# Patient Record
Sex: FEMALE | Race: WHITE | HISPANIC OR LATINO | Employment: FULL TIME | URBAN - METROPOLITAN AREA
[De-identification: names, ages, dates, MRNs, and addresses within clinical notes are randomized per-mention and may not be internally consistent; named-entity substitution may affect disease eponyms.]

---

## 2018-12-12 ENCOUNTER — NON-PROVIDER VISIT (OUTPATIENT)
Dept: OCCUPATIONAL MEDICINE | Facility: CLINIC | Age: 43
End: 2018-12-12

## 2018-12-12 DIAGNOSIS — Z02.1 PRE-EMPLOYMENT DRUG SCREENING: ICD-10-CM

## 2018-12-12 LAB
AMP AMPHETAMINE: NORMAL
COC COCAINE: NORMAL
INT CON NEG: NORMAL
INT CON POS: NORMAL
MET METHAMPHETAMINES: NORMAL
OPI OPIATES: NORMAL
PCP PHENCYCLIDINE: NORMAL
POC DRUG COMMENT 753798-OCCUPATIONAL HEALTH: NORMAL
THC: NORMAL

## 2018-12-12 PROCEDURE — 80305 DRUG TEST PRSMV DIR OPT OBS: CPT | Performed by: PREVENTIVE MEDICINE

## 2019-01-06 ENCOUNTER — HOSPITAL ENCOUNTER (EMERGENCY)
Dept: HOSPITAL 8 - ED | Age: 44
Discharge: HOME | End: 2019-01-06
Payer: SELF-PAY

## 2019-01-06 VITALS — DIASTOLIC BLOOD PRESSURE: 81 MMHG | SYSTOLIC BLOOD PRESSURE: 141 MMHG

## 2019-01-06 VITALS — HEIGHT: 62 IN | WEIGHT: 156.53 LBS | BODY MASS INDEX: 28.8 KG/M2

## 2019-01-06 DIAGNOSIS — J01.00: ICD-10-CM

## 2019-01-06 DIAGNOSIS — J01.10: ICD-10-CM

## 2019-01-06 DIAGNOSIS — H92.01: Primary | ICD-10-CM

## 2019-01-06 PROCEDURE — 99283 EMERGENCY DEPT VISIT LOW MDM: CPT

## 2022-06-29 ENCOUNTER — HOSPITAL ENCOUNTER (EMERGENCY)
Facility: MEDICAL CENTER | Age: 47
End: 2022-06-29
Attending: EMERGENCY MEDICINE
Payer: COMMERCIAL

## 2022-06-29 VITALS
HEART RATE: 79 BPM | WEIGHT: 151.01 LBS | OXYGEN SATURATION: 94 % | SYSTOLIC BLOOD PRESSURE: 152 MMHG | BODY MASS INDEX: 27.79 KG/M2 | RESPIRATION RATE: 16 BRPM | TEMPERATURE: 98.1 F | DIASTOLIC BLOOD PRESSURE: 89 MMHG | HEIGHT: 62 IN

## 2022-06-29 DIAGNOSIS — L02.91 ABSCESS: ICD-10-CM

## 2022-06-29 PROCEDURE — 700102 HCHG RX REV CODE 250 W/ 637 OVERRIDE(OP): Performed by: EMERGENCY MEDICINE

## 2022-06-29 PROCEDURE — 303977 HCHG I & D

## 2022-06-29 PROCEDURE — 99283 EMERGENCY DEPT VISIT LOW MDM: CPT

## 2022-06-29 PROCEDURE — A9270 NON-COVERED ITEM OR SERVICE: HCPCS | Performed by: EMERGENCY MEDICINE

## 2022-06-29 PROCEDURE — 700111 HCHG RX REV CODE 636 W/ 250 OVERRIDE (IP): Performed by: EMERGENCY MEDICINE

## 2022-06-29 RX ORDER — AMOXICILLIN 500 MG/1
500 CAPSULE ORAL ONCE
Status: COMPLETED | OUTPATIENT
Start: 2022-06-29 | End: 2022-06-29

## 2022-06-29 RX ORDER — SULFAMETHOXAZOLE AND TRIMETHOPRIM 800; 160 MG/1; MG/1
1 TABLET ORAL ONCE
Status: COMPLETED | OUTPATIENT
Start: 2022-06-29 | End: 2022-06-29

## 2022-06-29 RX ORDER — AMOXICILLIN 500 MG/1
500 CAPSULE ORAL 3 TIMES DAILY
Qty: 15 CAPSULE | Refills: 0 | Status: SHIPPED | OUTPATIENT
Start: 2022-06-29 | End: 2022-07-04

## 2022-06-29 RX ORDER — HYDROCODONE BITARTRATE AND ACETAMINOPHEN 5; 325 MG/1; MG/1
1 TABLET ORAL ONCE
Status: COMPLETED | OUTPATIENT
Start: 2022-06-29 | End: 2022-06-29

## 2022-06-29 RX ORDER — SULFAMETHOXAZOLE AND TRIMETHOPRIM 800; 160 MG/1; MG/1
1 TABLET ORAL 2 TIMES DAILY
Qty: 10 TABLET | Refills: 0 | Status: SHIPPED | OUTPATIENT
Start: 2022-06-29 | End: 2022-07-04

## 2022-06-29 RX ORDER — HYDROCODONE BITARTRATE AND ACETAMINOPHEN 5; 325 MG/1; MG/1
1 TABLET ORAL EVERY 4 HOURS PRN
Qty: 5 TABLET | Refills: 0 | Status: SHIPPED | OUTPATIENT
Start: 2022-06-29 | End: 2022-07-02

## 2022-06-29 RX ADMIN — SULFAMETHOXAZOLE AND TRIMETHOPRIM 1 TABLET: 800; 160 TABLET ORAL at 14:00

## 2022-06-29 RX ADMIN — HYDROCODONE BITARTRATE AND ACETAMINOPHEN 1 TABLET: 5; 325 TABLET ORAL at 13:18

## 2022-06-29 RX ADMIN — LIDOCAINE HYDROCHLORIDE 20 ML: 10 INJECTION, SOLUTION EPIDURAL; INFILTRATION; INTRACAUDAL; PERINEURAL at 13:30

## 2022-06-29 RX ADMIN — AMOXICILLIN 500 MG: 500 CAPSULE ORAL at 14:00

## 2022-06-29 NOTE — DISCHARGE INSTRUCTIONS
Please take the antibiotics as prescribed, your first dose was given in the emergency department see you may begin your prescriptions tonight.  You may take the pain medication as needed.  Return to the emergency department if you develop any new or worsening symptoms including worsening pain, swelling, redness, worsening rash, fevers, or if you have any further concerns.  The wound may continue to drain, please apply warm compresses or take warm showers at least twice daily to encourage it to do so.

## 2022-06-29 NOTE — ED NOTES
Pt ambulatory to room 80. Reports pain to R armpit x3 days.   3 areas of swelling noted to R armpit, no excessive redness or warmth to touch. Pt states used a metal razor that she hasn't used in a while while shaving.   Denies fevers/chills.   Pt resting, in NAD. Chart up for ERP.

## 2022-06-29 NOTE — ED PROVIDER NOTES
"ED Physician Note    Chief Concern:   Right axillary abscess    HPI:  Marilu Panchal is a very pleasant 46-year-old woman who presents to the emergency department for evaluation of pain and swelling localized to the right axilla.  Symptoms began about 3 days ago, initially described as a small pimple-like area, she has had progressively worsening pain and swelling since that time, and now has a second area of pain and swelling in proximity of the first, and the right axilla as well.  She reports no history of impaired immunity, she reports no history of diabetes, she does smoke cigarettes regularly.  She reports no other rashes or lesions, she has no associated fevers, no nausea, no vomiting.  She has no other concerns at this time.    Review of Systems:  See HPI for pertinent positives and negatives.  Past Medical History:       Social History:  Social History     Tobacco Use   • Smoking status: Current Every Day Smoker     Packs/day: 0.50     Types: Cigarettes   • Smokeless tobacco: Never Used   Vaping Use   • Vaping Use: Never used   Substance and Sexual Activity   • Alcohol use: No   • Drug use: No   • Sexual activity: Not on file       Surgical History:  patient denies any surgical history    Current Medications:  Home Medications    **Home medications have not yet been reviewed for this encounter**         Allergies:  No Known Allergies    Physical Exam:  Vital Signs: BP (!) 163/101   Pulse 87   Temp 36.7 °C (98 °F) (Temporal)   Resp 16   Ht 1.575 m (5' 2\")   Wt 68.5 kg (151 lb 0.2 oz)   SpO2 92%   BMI 27.62 kg/m²   Constitutional: Alert, no acute distress  HENT: Atraumatic  Neck: Normal range of motion  Cardiovascular: Right upper extremity warm, well perfused  Pulmonary: Normal work of breathing  Skin: Right axilla with a central area of fluctuance with some overlying redness and surrounding erythema.  There is a small area of induration just distal to the abscess, though no palpable fluctuance.  " Area is appropriately tender to palpation.  Fluctuance is very superficial on palpation.  Musculoskeletal: Normal range of motion of the right shoulder, no evidence of septic joint, abnormalities as documented in skin exam  Neurologic: Right upper extremity with normal sensory and motor function    Medical records reviewed for continuity of care.  No recent visits for similar symptoms.    ED Medications Administered:  Medications   lidocaine (XYLOCAINE) 1 % injection 20 mL (has no administration in time range)   sulfamethoxazole-trimethoprim (BACTRIM DS) 800-160 MG tablet 1 Tablet (has no administration in time range)   amoxicillin (AMOXIL) capsule 500 mg (has no administration in time range)   HYDROcodone-acetaminophen (NORCO) 5-325 MG per tablet 1 Tablet (1 Tablet Oral Given 6/29/22 1318)     MDM:  Ms. Panchal presents to the emergency department for evaluation of an abscess and cellulitis localized to the right axilla.  Symptoms began about 3 days ago, however very mild initially and has progressively worsened.  Area is very superficial, there is palpable fluctuance, no evidence of lymph node involvement.  She is afebrile with no systemic symptoms.  Abscess was incised and drained according to the below procedure note, she tolerated the procedure well without complications.  She was given a dose of amoxicillin as well as Bactrim in the emergency department and discharged on prescriptions for the same.  She was also given a dose of hydrocodone in the emergency department and discharged with a small amount of hydrocodone tablets for ongoing pain.  She is counseled to follow-up with her primary care physician for complete recheck within 2 to 3 days. Return precautions were discussed with the patient, and provided in written form with the patient's discharge instructions.       Procedure Note: Complex Incision and Drainage  The procedure was performed by Dr. Osman. Risks, benefits and alternatives were discussed  and consent was obtained from the patient. A time out was performed and appropriate patient, procedure, site, and equipment were verified.  Procedure: Abscess drainage  Location: Right axilla  Anesthesia: Local infiltration with 1 mL of 1% lidocaine without epinephrine  Skin overlying the wound was cleansed with betadine prior to the procedure. After verification of adequate anesthesia, #11 blade was used to make a single incision over the area of greatest fluctuance. The wound was probed using sterile forceps to break up multiple loculations. A moderate amount of purulent and sanguinous fluid was drained. Wound was dressed on completion of the procedure. The procedure was well tolerated with minimal blood loss and no complications.       Personal protective equipment including N95 surgical respirator, goggles, and gloves were used during this encounter.       Disposition:  Discharge home in stable condition    Final Impression:  1. Abscess        Electronically signed by: Tosin Osman MD, 6/29/2022 1:58 PM

## 2022-06-29 NOTE — ED NOTES
Pt medicated per MAR. Tolerated well. Pt resting, no distress,  at bedside.  Lidocaine provided to ERP.

## 2022-06-29 NOTE — ED TRIAGE NOTES
Marilu Panchal  46 y.o. female  Chief Complaint   Patient presents with   • Arm Pain     Under right armpit pt has pain, redness and swelling. Worsening for the last 3x days.        Vitals:    06/29/22 1127   BP: (!) 163/101   Pulse: 87   Resp: 16   Temp: 36.7 °C (98 °F)   SpO2: 92%       Patient educated on triage process and encouraged to alert staff of any changes in condition.    Pt ambulatory to triage for the above complaint. Denies any recent fevers.

## 2022-07-07 ENCOUNTER — HOSPITAL ENCOUNTER (EMERGENCY)
Facility: MEDICAL CENTER | Age: 47
End: 2022-07-07
Attending: EMERGENCY MEDICINE
Payer: COMMERCIAL

## 2022-07-07 VITALS
RESPIRATION RATE: 18 BRPM | OXYGEN SATURATION: 96 % | SYSTOLIC BLOOD PRESSURE: 135 MMHG | HEIGHT: 62 IN | DIASTOLIC BLOOD PRESSURE: 88 MMHG | WEIGHT: 151.68 LBS | TEMPERATURE: 97.6 F | BODY MASS INDEX: 27.91 KG/M2 | HEART RATE: 77 BPM

## 2022-07-07 DIAGNOSIS — L02.411 ABSCESS OF RIGHT AXILLA: ICD-10-CM

## 2022-07-07 LAB
GRAM STN SPEC: NORMAL
SIGNIFICANT IND 70042: NORMAL
SITE SITE: NORMAL
SOURCE SOURCE: NORMAL

## 2022-07-07 PROCEDURE — 96372 THER/PROPH/DIAG INJ SC/IM: CPT

## 2022-07-07 PROCEDURE — 303977 HCHG I & D

## 2022-07-07 PROCEDURE — 87205 SMEAR GRAM STAIN: CPT

## 2022-07-07 PROCEDURE — 99283 EMERGENCY DEPT VISIT LOW MDM: CPT

## 2022-07-07 PROCEDURE — 700111 HCHG RX REV CODE 636 W/ 250 OVERRIDE (IP): Performed by: EMERGENCY MEDICINE

## 2022-07-07 PROCEDURE — 87070 CULTURE OTHR SPECIMN AEROBIC: CPT

## 2022-07-07 PROCEDURE — 87077 CULTURE AEROBIC IDENTIFY: CPT

## 2022-07-07 PROCEDURE — 87186 SC STD MICRODIL/AGAR DIL: CPT

## 2022-07-07 RX ADMIN — LIDOCAINE HYDROCHLORIDE 20 ML: 10 INJECTION, SOLUTION EPIDURAL; INFILTRATION; INTRACAUDAL; PERINEURAL at 16:15

## 2022-07-07 RX ADMIN — MORPHINE SULFATE 10 MG: 10 INJECTION INTRAVENOUS at 16:13

## 2022-07-07 ASSESSMENT — FIBROSIS 4 INDEX: FIB4 SCORE: 0.48

## 2022-07-07 NOTE — ED PROVIDER NOTES
"ED Provider Note    CHIEF COMPLAINT  Chief Complaint   Patient presents with   • Abscess       HPI  Marilu Panchal is a 46 y.o. female who presents complaining of recurrent right axillary abscesses.    Patient states she was here last week for incision and drainage of a right axillary abscess.  She has no history of this.  She has developed several more, smaller, less painful ones in the same region and one larger one.  She does shave her armpits.  She has none in the groin area.  She denies a history of MRSA.  Patient states she took the antibiotics she was prescribed following the last incision and drainage.  The area that was incised last time has not recurred.    Patient states her pain is mainly with lowering her arm.  It is moderate.  She did not like the Norco she was prescribed as they made her feel nauseous/sick.    Patient denies fever, chills, history of diabetes.      ALLERGIES  No Known Allergies    CURRENT MEDICATIONS  Home Medications     Reviewed by Luz Ahumada R.N. (Registered Nurse) on 07/07/22 at 1433  Med List Status: Not Addressed   Medication Last Dose Status   methylPREDNISolone (MEDROL, CAMPBELL,) 4 MG tablet  Active                PAST MEDICAL HISTORY     Back pain    SURGICAL HISTORY  patient denies any surgical history    SOCIAL HISTORY  Social History     Tobacco Use   • Smoking status: Current Every Day Smoker     Packs/day: 0.25     Types: Cigarettes   • Smokeless tobacco: Never Used   Vaping Use   • Vaping Use: Never used   Substance and Sexual Activity   • Alcohol use: No   • Drug use: No   • Sexual activity: Not on file         REVIEW OF SYSTEMS  See HPI for further details.  All other systems are negative except as above in HPI.      PHYSICAL EXAM  VITAL SIGNS: BP (!) 146/93   Pulse 79   Temp 36.2 °C (97.1 °F) (Temporal)   Resp 16   Ht 1.575 m (5' 2\")   Wt 68.8 kg (151 lb 10.8 oz)   LMP 06/30/2022   SpO2 95%   BMI 27.74 kg/m²     General:  WDWN female, nontoxic appearing in " NAD; A+Ox3; V/S as above, afebrile  Skin: warm and dry; good color; no rash  HEENT: NCAT; EOMs intact; PERRL; no scleral icterus   Neck: FROM; soft  Extremities: OROURKE x 4; tender, fluctuant boil with a central whitish head; 3 other areas of erythema that are less tender and not fluctuant; no obvious ingrown hairs; no streaking, no crepitus; axillary nodes that are soft and mobile noted  Neurologic: CNs III-XII grossly intact; speech clear; distal sensation intact; strength 5/5 UE/LEs  Psychiatric: Appropriate affect, normal mood    LABS  Wound culture    PROCEDURES  Incision and Drainage Procedure Note    Indication: axillary abscess    Procedure: The patient was positioned appropriately and the skin over the incision site was draped in a sterile fashion. Local anesthesia was obtained by infiltration using 1.0 cc of 1% Lidocaine without epinephrine.  An incision was then made over the center of the lesion and approximately 3 cc of purulent and yellow material was expressed.    The patient tolerated the procedure well.    Complications: None      MEDICAL RECORD  I have reviewed patient's medical record and pertinent results are listed below.      COURSE & MEDICAL DECISION MAKING  I have reviewed any medical record information, laboratory studies and radiographic results as noted.    Marilu Panchal is a 46 y.o. female who presents complaining of right axillary abscesses.  Patient was seen here last week for an incision and drainage of an abscess that has resolved in the right axilla.  She has 4 new boils, one of which is amenable to being I&D'd today.  The others are early in their process.  I have encouraged her to apply warm compresses to the area rather than taking warm showers which I think are drying out her skin as she lets the water run for 20 minutes..  Wound culture was obtained.  I have contacted the ER  and requested that the patient be assigned to a PCP.    Appropriate PPE was worn at all times  while interacting with the patient.      IMPRESSION  1. Abscess of right axilla         Electronically signed by: Olya John M.D., 7/7/2022 3:48 PM

## 2022-07-07 NOTE — ED TRIAGE NOTES
"Chief Complaint   Patient presents with   • Abscess     Pt to ED from home states she was seen here for R armpit abscess L&D 1 week prior pt states there are now more. Pt states she finished ordeded Abx.  Pt educated on the triage process. Instructed to notify staff of any change or worsening of condition; sent to lobby to await room assignment.    BP (!) 146/93   Pulse 79   Temp 36.2 °C (97.1 °F) (Temporal)   Resp 16   Ht 1.575 m (5' 2\")   Wt 68.8 kg (151 lb 10.8 oz)   LMP 06/30/2022   SpO2 95%   BMI 27.74 kg/m²     "

## 2022-07-07 NOTE — DISCHARGE INSTRUCTIONS
Use warm compresses several times a day for 15 to 20 minutes.    Return to the ER for fever, increased pain, increased swelling, or other concerns    Follow-up with a primary care provider.  We have left a message for the ER  who will call you and assign you to someone.  You may also call the [facility] or [facility] clinics.    A wound culture from your abscess is pending and will let us know if there is a particular bacteria that is causing your abscesses.

## 2022-07-09 LAB
BACTERIA WND AEROBE CULT: ABNORMAL
BACTERIA WND AEROBE CULT: ABNORMAL
GRAM STN SPEC: ABNORMAL
SIGNIFICANT IND 70042: ABNORMAL
SITE SITE: ABNORMAL
SOURCE SOURCE: ABNORMAL

## 2022-07-12 ENCOUNTER — TELEPHONE (OUTPATIENT)
Dept: PHARMACY | Facility: MEDICAL CENTER | Age: 47
End: 2022-07-12

## 2022-07-12 RX ORDER — SULFAMETHOXAZOLE AND TRIMETHOPRIM 800; 160 MG/1; MG/1
1 TABLET ORAL 2 TIMES DAILY
Qty: 10 TABLET | Refills: 0 | Status: SHIPPED | OUTPATIENT
Start: 2022-07-12 | End: 2022-07-17

## 2022-07-13 NOTE — ED NOTES
"ED Positive Culture Follow-up/Notification Note:    Date: 7/12/2022     Patient seen in the ED on 7/7/2022 for recurrent axillary abscesses with I&D in the ED.   1. Abscess of right axilla       Discharge Medication List as of 7/7/2022  4:45 PM          Allergies: Patient has no known allergies.     Vitals:    07/07/22 1406 07/07/22 1431 07/07/22 1644   BP: (!) 146/93  135/88   Pulse: 79  77   Resp: 16  18   Temp: 36.2 °C (97.1 °F)  36.4 °C (97.6 °F)   TempSrc: Temporal  Temporal   SpO2: 95%  96%   Weight:  68.8 kg (151 lb 10.8 oz)    Height: 1.575 m (5' 2\") 1.575 m (5' 2\")        Final cultures:   Results     Procedure Component Value Units Date/Time    CULTURE WOUND W/ GRAM STAIN [[id number]]  (Abnormal)  (Susceptibility) Collected: 07/07/22 1607    Order Status: Completed Specimen: Wound from Abscess Updated: 07/09/22 1050     Significant Indicator POS     Source WND     Site right arpmit abscess     Culture Result -     Gram Stain Result Moderate WBCs.  Few Gram positive cocci.       Culture Result Methicillin Resistant Staphylococcus aureus  Moderate growth      Susceptibility     Methicillin resistant staphylococcus aureus (1)     Antibiotic Interpretation Microscan   Method Status    Ampicillin  [*]  Resistant >8 mcg/mL TERESITA Final    Amoxicillin/CA  [*]  Resistant <=4/2 mcg/mL TERESITA Final    Azithromycin Resistant >4 mcg/mL TERESITA Final    Ceftriaxone  [*]  Resistant <=4 mcg/mL TERESITA Final    Clindamycin Sensitive <=0.25 mcg/mL TERESITA Final    Cephalothin  [*]  Resistant <=8 mcg/mL TERESITA Final    Cefoxitin Screen  [*]  Positive >4 mcg/mL TERESITA Final    Cefazolin Resistant <=8 mcg/mL TERESITA Final    Ciprofloxacin  [*]  Sensitive <=1 mcg/mL TERESITA Final    Cefepime Resistant <=4 mcg/mL TERESITA Final    Ceftaroline Sensitive <=0.5 mcg/mL TERESITA Final    Daptomycin Sensitive <=0.5 mcg/mL TERESITA Final    Ampicillin/sulbactam Resistant <=8/4 mcg/mL TERESITA Final    Erythromycin Intermediate 2 mcg/mL TERESITA Final    Vancomycin Sensitive 1 mcg/mL TERESITA " Final    Gentamicin  [*]  Sensitive <=4 mcg/mL TERESITA Final    Inducible Clindamycin Test  [*]  Negative <=4/0.5 mcg/mL TERESITA Final    Imipenem  [*]  Resistant <=4 mcg/mL TERESITA Final    Levofloxacin  [*]  Sensitive <=1 mcg/mL TERESITA Final    Linezolid  [*]  Sensitive 2 mcg/mL TERESITA Final    Meropenem  [*]  Resistant <=2 mcg/mL TERESITA Final    Oxacillin Resistant >2 mcg/mL TERESITA Final    Rifampin  [*]  Sensitive <=1 mcg/mL TERESITA Final    Synercid  [*]  Sensitive <=0.5 mcg/mL TERESITA Final    Trimeth/Sulfa Sensitive <=0.5/9.5 mcg/mL TERESITA Final    Tetracycline Sensitive <=4 mcg/mL TERESITA Final    Tigecycline  [*]  Sensitive <=0.25 mcg/mL TERESITA Final           [*]  Suppressed Antibiotic                 GRAM STAIN [[id number]] Collected: 07/07/22 1607    Order Status: Completed Specimen: Wound Updated: 07/07/22 2037     Significant Indicator .     Source WND     Site right arpmit abscess     Gram Stain Result Moderate WBCs.  Few Gram positive cocci.            Plan:   Patient not currently on antibiotic therapy. I discussed the result with the patient and she stated that the abscesses were improving and drying out. She did the compresses as instructed. They do continue to itch. I have prescribed her Bactrim DS I po BID x 5 days to the [facility] on [facility].    Ruth Marroquin, PharmD

## 2022-08-03 ENCOUNTER — HOSPITAL ENCOUNTER (EMERGENCY)
Facility: MEDICAL CENTER | Age: 47
End: 2022-08-03
Attending: EMERGENCY MEDICINE
Payer: COMMERCIAL

## 2022-08-03 VITALS
DIASTOLIC BLOOD PRESSURE: 110 MMHG | WEIGHT: 152.56 LBS | HEIGHT: 62 IN | SYSTOLIC BLOOD PRESSURE: 149 MMHG | HEART RATE: 89 BPM | TEMPERATURE: 98.1 F | OXYGEN SATURATION: 95 % | RESPIRATION RATE: 18 BRPM | BODY MASS INDEX: 28.07 KG/M2

## 2022-08-03 DIAGNOSIS — H60.501 ACUTE OTITIS EXTERNA OF RIGHT EAR, UNSPECIFIED TYPE: ICD-10-CM

## 2022-08-03 PROCEDURE — 700102 HCHG RX REV CODE 250 W/ 637 OVERRIDE(OP): Performed by: EMERGENCY MEDICINE

## 2022-08-03 PROCEDURE — A9270 NON-COVERED ITEM OR SERVICE: HCPCS | Performed by: EMERGENCY MEDICINE

## 2022-08-03 PROCEDURE — 99282 EMERGENCY DEPT VISIT SF MDM: CPT

## 2022-08-03 RX ORDER — OFLOXACIN 3 MG/ML
5 SOLUTION AURICULAR (OTIC) DAILY
Qty: 10 ML | Refills: 0 | Status: SHIPPED | OUTPATIENT
Start: 2022-08-03 | End: 2022-08-03

## 2022-08-03 RX ORDER — ACETAMINOPHEN 325 MG/1
650 TABLET ORAL ONCE
Status: COMPLETED | OUTPATIENT
Start: 2022-08-03 | End: 2022-08-03

## 2022-08-03 RX ORDER — CIPROFLOXACIN AND DEXAMETHASONE 3; 1 MG/ML; MG/ML
4 SUSPENSION/ DROPS AURICULAR (OTIC) 2 TIMES DAILY
Qty: 7.5 ML | Refills: 0 | Status: SHIPPED | OUTPATIENT
Start: 2022-08-03 | End: 2022-08-10

## 2022-08-03 RX ADMIN — ACETAMINOPHEN 650 MG: 325 TABLET, FILM COATED ORAL at 10:56

## 2022-08-03 ASSESSMENT — FIBROSIS 4 INDEX: FIB4 SCORE: 0.49

## 2022-08-03 ASSESSMENT — PAIN DESCRIPTION - PAIN TYPE: TYPE: ACUTE PAIN

## 2022-08-03 NOTE — ED NOTES
DC orders received. Patient provided AVS and explained further instructions . Pt educated to come back to ER if symptoms to become worse.   Pt understood. All questions answered. Work noted provided.No line in place. Patient AAOx4 and ambulatory.  Pt discharged to self w/o incident.

## 2022-08-03 NOTE — ED TRIAGE NOTES
"Chief Complaint   Patient presents with   • Ear Pain     R ear pain that started this morning. Pt reports her ear had been \"leaking\" for about a week and she had a fever a couple days ago.        Pt ambulated to triage for above complaint.  Pt is AO x 4, follows commands, and responds appropriately to questions. Patient's breathing is unlabored and pain is currently 10/10 on the 0-10 pain scale.  Pt placed in lobby. Patient educated on triage process and encouraged to alert staff for any changes.     BP (!) 143/95   Pulse 74   Temp 36.6 °C (97.8 °F) (Temporal)   Resp 18   Ht 1.575 m (5' 2\")   Wt 69.2 kg (152 lb 8.9 oz)   SpO2 97%     "

## 2022-08-03 NOTE — ED NOTES
Pt called from [facility] and ambulated to [facility] 02. Pt c/o ear pain that has been persistent for about a week. Pt reports ear drainage and 10/10 pain.

## 2022-08-03 NOTE — ED PROVIDER NOTES
"ED Provider Note    CHIEF COMPLAINT  Chief Complaint   Patient presents with   • Ear Pain     R ear pain that started this morning. Pt reports her ear had been \"leaking\" for about a week and she had a fever a couple days ago.        HPI  Marilu Panchal is a 47 y.o. female who presents with right ear pain.  She has had off-and-on discomfort and drainage from the ear for the last week.  She reports that she felt hot a couple days ago, but no ongoing fever.  Her pain started today.  Throbbing nonradiating.  Worse when she moves the ear.  No chest pain shortness of breath cough, nausea, vomiting.  She does report that she has had off-and-on diarrhea for a little less than a week.  No bloody stool.    REVIEW OF SYSTEMS  As per HPI    PAST MEDICAL HISTORY  History reviewed. No pertinent past medical history.    SOCIAL HISTORY  Social History     Tobacco Use   • Smoking status: Current Every Day Smoker     Packs/day: 0.25     Types: Cigarettes   • Smokeless tobacco: Never Used   Vaping Use   • Vaping Use: Never used   Substance Use Topics   • Alcohol use: No   • Drug use: No       SURGICAL HISTORY  History reviewed. No pertinent surgical history.    ALLERGIES  No Known Allergies    PHYSICAL EXAM  VITAL SIGNS: BP (!) 143/95   Pulse 74   Temp 36.6 °C (97.8 °F) (Temporal)   Resp 18   Ht 1.575 m (5' 2\")   Wt 69.2 kg (152 lb 8.9 oz)   LMP 06/30/2022   SpO2 97%   BMI 27.90 kg/m²    Constitutional: Awake and alert. Nontoxic  HENT: There is pain with movement of the right pinna.  Tenderness around the ear.  No tenderness over the mastoid.  External auditory canal tissue is thickened with clear discharge.  Visualized tympanic membrane appears normal.  The left external canal and tympanic membrane are normal.  Eyes: Grossly normal  Neck: Normal range of motion  Cardiovascular: Normal heart rate   Thorax & Lungs: No respiratory distress  Skin:  No pathologic rash.   Extremities: Well perfused  Psychiatric: Affect " normal      COURSE & MEDICAL DECISION MAKING  Patient presents with right ear pain.  She is identified to have right otitis externa on examination.  This will be treated with Ciprodex.  She complained of discomfort and she is given Tylenol.  Advised Tylenol and/or ibuprofen as needed.  She reported having off-and-on diarrhea.  She will watch this to ensure improvement.  Referred to [facility] for recheck if persistent symptoms.  Return to ER for worsening or concern.        FINAL IMPRESSION  1.  Acute right otitis externa      Disposition: home in good condition      This dictation was created using voice recognition software. The accuracy of the dictation is limited to the abilities of the software.  The nursing notes were reviewed and certain aspects of this information were incorporated into this note.      Electronically signed by: Chuy Grewal M.D., 8/3/2022 10:44 AM

## 2022-09-15 ENCOUNTER — HOSPITAL ENCOUNTER (EMERGENCY)
Facility: MEDICAL CENTER | Age: 47
End: 2022-09-15
Attending: EMERGENCY MEDICINE
Payer: COMMERCIAL

## 2022-09-15 VITALS
BODY MASS INDEX: 27.8 KG/M2 | DIASTOLIC BLOOD PRESSURE: 82 MMHG | RESPIRATION RATE: 16 BRPM | OXYGEN SATURATION: 95 % | TEMPERATURE: 97.5 F | SYSTOLIC BLOOD PRESSURE: 127 MMHG | WEIGHT: 152 LBS | HEART RATE: 88 BPM

## 2022-09-15 DIAGNOSIS — L02.91 ABSCESS: ICD-10-CM

## 2022-09-15 PROCEDURE — 700102 HCHG RX REV CODE 250 W/ 637 OVERRIDE(OP): Performed by: EMERGENCY MEDICINE

## 2022-09-15 PROCEDURE — A9270 NON-COVERED ITEM OR SERVICE: HCPCS | Performed by: EMERGENCY MEDICINE

## 2022-09-15 PROCEDURE — 99283 EMERGENCY DEPT VISIT LOW MDM: CPT

## 2022-09-15 RX ORDER — CLINDAMYCIN HYDROCHLORIDE 150 MG/1
150 CAPSULE ORAL ONCE
Status: COMPLETED | OUTPATIENT
Start: 2022-09-15 | End: 2022-09-15

## 2022-09-15 RX ORDER — CLINDAMYCIN HYDROCHLORIDE 150 MG/1
150 CAPSULE ORAL 3 TIMES DAILY
Qty: 30 CAPSULE | Refills: 0 | Status: SHIPPED | OUTPATIENT
Start: 2022-09-15

## 2022-09-15 RX ORDER — HYDROCODONE BITARTRATE AND ACETAMINOPHEN 5; 325 MG/1; MG/1
1 TABLET ORAL ONCE
Status: COMPLETED | OUTPATIENT
Start: 2022-09-15 | End: 2022-09-15

## 2022-09-15 RX ADMIN — CLINDAMYCIN HYDROCHLORIDE 150 MG: 150 CAPSULE ORAL at 14:14

## 2022-09-15 RX ADMIN — HYDROCODONE BITARTRATE AND ACETAMINOPHEN 1 TABLET: 5; 325 TABLET ORAL at 14:14

## 2022-09-15 ASSESSMENT — FIBROSIS 4 INDEX: FIB4 SCORE: 0.49

## 2022-09-15 NOTE — ED PROVIDER NOTES
ED Provider Note    CHIEF COMPLAINT  Chief Complaint   Patient presents with    Other       HPI  Marilu Panchal is a 47 y.o. female here for evaluation of right axillary abscess.  The pt states that she has a small abscess to the right axilla, that she often gets.  She also has one on the waistband area of her lower abd.  She states they are small and 'hard' but usually abx will work.  No fever/chills. No vomiting, no cp, no sob, no abdominal pain.     ROS;  Please see HPI  O/W negative     PAST MEDICAL HISTORY   abscess    SOCIAL HISTORY  Social History     Tobacco Use    Smoking status: Every Day     Packs/day: 0.25     Types: Cigarettes    Smokeless tobacco: Never   Vaping Use    Vaping Use: Never used   Substance and Sexual Activity    Alcohol use: No    Drug use: No    Sexual activity: Not on file       SURGICAL HISTORY  patient denies any surgical history    CURRENT MEDICATIONS  Home Medications       Reviewed by Kimberly Moe R.N. (Registered Nurse) on 09/15/22 at 1250  Med List Status: Not Addressed     Medication Last Dose Status   methylPREDNISolone (MEDROL, CAMPBELL,) 4 MG tablet  Active                    ALLERGIES  No Known Allergies    REVIEW OF SYSTEMS  See HPI for further details. Review of systems as above, otherwise all other systems are negative.     PHYSICAL EXAM  VITAL SIGNS: BP (!) 130/91   Pulse 94   Temp 36.5 °C (97.7 °F) (Temporal)   Resp 18   Wt 68.9 kg (152 lb)   LMP 09/08/2022   SpO2 94%   BMI 27.80 kg/m²     Constitutional: Well developed, well nourished. No acute distress.  HEENT: Normocephalic, atraumatic. MMM  Neck: Supple, Full range of motion   Chest/Pulmonary:  No respiratory distress.  Equal expansion   Musculoskeletal: No deformity, no edema, neurovascular intact.   Neuro: Clear speech, appropriate, cooperative, cranial nerves II-XII grossly intact.  Psych: Normal mood and affect  Skin; right axilla with 1cm area of induration.  No fluctuance. No surrounding erythema.        PROCEDURES     MEDICAL RECORD  I have reviewed patient's medical record and pertinent results are listed.    COURSE & MEDICAL DECISION MAKING  I have reviewed any medical record information, laboratory studies and radiographic results as noted above.    I you have had any blood pressure issues while here in the emergency department, please see your doctor for a further evaluation or work up.    Differential diagnoses include but not limited to: abscess. Cellulitis.     This patient presents with  abscess.  At this time, I have counseled the patient/family regarding their medications, pain control, and follow up.  They will continue their medications, if any, as prescribed.  They will return immediately for any worsening symptoms and/or any other medical concerns.  They will see their doctor, or contact the doctor provided, in 1-2 days for follow up.       FINAL IMPRESSION  1. Abscess  clindamycin (CLEOCIN) 150 MG Cap              Electronically signed by: Benjamin Tillman D.O., 9/15/2022 2:07 PM

## 2022-10-06 ENCOUNTER — HOSPITAL ENCOUNTER (EMERGENCY)
Facility: MEDICAL CENTER | Age: 47
End: 2022-10-06
Attending: EMERGENCY MEDICINE
Payer: COMMERCIAL

## 2022-10-06 VITALS
RESPIRATION RATE: 16 BRPM | DIASTOLIC BLOOD PRESSURE: 112 MMHG | SYSTOLIC BLOOD PRESSURE: 142 MMHG | OXYGEN SATURATION: 94 % | WEIGHT: 152.12 LBS | TEMPERATURE: 97.8 F | HEIGHT: 62 IN | HEART RATE: 87 BPM | BODY MASS INDEX: 27.99 KG/M2

## 2022-10-06 DIAGNOSIS — M54.50 ACUTE BILATERAL LOW BACK PAIN WITHOUT SCIATICA: ICD-10-CM

## 2022-10-06 DIAGNOSIS — M41.9 SCOLIOSIS, UNSPECIFIED SCOLIOSIS TYPE, UNSPECIFIED SPINAL REGION: ICD-10-CM

## 2022-10-06 PROCEDURE — 99282 EMERGENCY DEPT VISIT SF MDM: CPT

## 2022-10-06 ASSESSMENT — FIBROSIS 4 INDEX: FIB4 SCORE: 0.49

## 2022-10-06 NOTE — Clinical Note
Marilu Aldair was seen and treated in our emergency department on 10/6/2022.  She may return to work on 10/07/2022.  Light duty with lifting less than 20 lbs until further notice     If you have any questions or concerns, please don't hesitate to call.      Mil Erazo M.D.

## 2022-10-06 NOTE — ED PROVIDER NOTES
ED Provider Note    Scribed for Mil Erazo M.D. by Suzy Canales. 10/6/2022  11:15 AM    Primary care provider: Pcp Pt States None  Means of arrival: Walk in   History obtained from: Patient  History limited by: None    CHIEF COMPLAINT  Chief Complaint   Patient presents with    Low Back Pain     X3 months. Denies injury. Reports a lot of heavy lifting at work. Requesting a doctors note to do lighter lifting.     Letter for School/Work       HPI  Marilu Quinteros is a 47 y.o. female who presents to the Emergency Department for evaluation of lower back pain onset three months. Patient endorses a lot of heaving lifting at work during these past few months. She is requesting a work note in order to allow her to do lighter lifting. She admits to associated symptoms of lower back pain since yesterday when she started working at [employer].  She informed them that she has scoliosis and cannot do heavy lifting more than 20 pounds and they require a note to return to work. No alleviating factors were reported.      REVIEW OF SYSTEMS  Pertinent negatives include no fever, trauma, or headache. As above, all other systems reviewed and are negative.   See HPI for further details.     PAST MEDICAL HISTORY  None noted    SURGICAL HISTORY  patient denies any surgical history    SOCIAL HISTORY  Social History     Tobacco Use    Smoking status: Every Day     Packs/day: 0.25     Types: Cigarettes    Smokeless tobacco: Never   Vaping Use    Vaping Use: Never used   Substance Use Topics    Alcohol use: No    Drug use: No      Social History     Substance and Sexual Activity   Drug Use No       FAMILY HISTORY  None noted.     CURRENT MEDICATIONS  Home Medications       Reviewed by Mendez Crowley R.N. (Registered Nurse) on 10/06/22 at 1039  Med List Status: Partial     Medication Last Dose Status   clindamycin (CLEOCIN) 150 MG Cap  Active   methylPREDNISolone (MEDROL, CAMPBELL,) 4 MG tablet  Active                    ALLERGIES  No  "Known Allergies    PHYSICAL EXAM  VITAL SIGNS: BP (!) 149/101   Pulse 87   Temp 36.4 °C (97.6 °F) (Temporal)   Resp 16   Ht 1.575 m (5' 2\")   Wt 69 kg (152 lb 1.9 oz)   LMP 09/08/2022   SpO2 96%   BMI 27.82 kg/m²   Constitutional: Well developed, Well nourished, No acute distress, Non-toxic appearance.   Abdomen: Soft non-tender non-distended. There is no rebound or guarding. No organomegaly is appreciated. Bowel sounds are normal.  Skin: Normal without rash.   Back: Positive for scoliosis with lower lumbar midline tenderness.  Negative straight leg raise bilaterally  Extremities: Intact distal pulses, No edema, No tenderness, No cyanosis, No clubbing. Capillary refill is less than 2 seconds.  Musculoskeletal: Good range of motion in all major joints. No tenderness to palpation or major deformities noted.   Neurologic: Alert & oriented x 3, Normal motor function, Normal sensory function, No focal deficits noted. Reflexes are normal.  Psychiatric: Affect normal, Judgment normal, Mood normal. There is no suicidal ideation or patient reported hallucinations.       DIAGNOSTIC STUDIES / PROCEDURES    COURSE & MEDICAL DECISION MAKING  Nursing notes, VS, PMSFHx reviewed in chart.    11:15 AM Patient seen and examined at bedside. I discussed plan for discharge and follow up as outlined below. The patient is stable for discharge at this time and will return for any new or worsening symptoms. Patient verbalizes understanding and support with my plan for discharge.     No signs of cauda equina or spinal epidural abscess and vital signs are unremarkable except for slightly elevated blood pressure which her primary care physician can manage    The patient will return for new or worsening symptoms and is stable at the time of discharge.    DISPOSITION:  Patient will be discharged home in stable condition.  I given her a work note to return to work tomorrow and indefinitely she will lift no more than 20 pounds going " forward.  There is no indication for imaging    FINAL IMPRESSION  1. Scoliosis, unspecified scoliosis type, unspecified spinal region    2. Acute bilateral low back pain without sciatica          Suzy SANTIAGO (Scriblivia), am scribing for, and in the presence of, Mil Erazo M.D..    Electronically signed by: Suzy Canales (Scribe), 10/6/2022    IMil M.D. personally performed the services described in this documentation, as scribed by Suzy Canales in my presence, and it is both accurate and complete.    The note accurately reflects work and decisions made by me.  Mil Erazo M.D.  10/6/2022  11:41 AM

## 2022-10-06 NOTE — ED TRIAGE NOTES
"Chief Complaint   Patient presents with    Low Back Pain     X3 months. Denies injury. Reports a lot of heavy lifting at work. Requesting a doctors note to do lighter lifting.     Letter for School/Work     Pt ambulatory to triage for above.     BP (!) 149/101   Pulse 87   Temp 36.4 °C (97.6 °F) (Temporal)   Resp 16   Ht 1.575 m (5' 2\")   Wt 69 kg (152 lb 1.9 oz)   LMP 09/08/2022   SpO2 96%   BMI 27.82 kg/m²     "

## 2022-11-05 ENCOUNTER — HOSPITAL ENCOUNTER (EMERGENCY)
Facility: MEDICAL CENTER | Age: 47
End: 2022-11-05
Attending: EMERGENCY MEDICINE
Payer: COMMERCIAL

## 2022-11-05 VITALS
HEIGHT: 62 IN | SYSTOLIC BLOOD PRESSURE: 189 MMHG | DIASTOLIC BLOOD PRESSURE: 98 MMHG | RESPIRATION RATE: 18 BRPM | BODY MASS INDEX: 27.99 KG/M2 | TEMPERATURE: 97.5 F | HEART RATE: 82 BPM | WEIGHT: 152.12 LBS | OXYGEN SATURATION: 97 %

## 2022-11-05 DIAGNOSIS — M41.9 SCOLIOSIS, UNSPECIFIED SCOLIOSIS TYPE, UNSPECIFIED SPINAL REGION: ICD-10-CM

## 2022-11-05 DIAGNOSIS — M54.9 CHRONIC BACK PAIN, UNSPECIFIED BACK LOCATION, UNSPECIFIED BACK PAIN LATERALITY: ICD-10-CM

## 2022-11-05 DIAGNOSIS — G89.29 CHRONIC BACK PAIN, UNSPECIFIED BACK LOCATION, UNSPECIFIED BACK PAIN LATERALITY: ICD-10-CM

## 2022-11-05 PROCEDURE — 99282 EMERGENCY DEPT VISIT SF MDM: CPT

## 2022-11-05 ASSESSMENT — FIBROSIS 4 INDEX: FIB4 SCORE: 0.49

## 2022-11-05 NOTE — ED NOTES
Discharge orders received. Patient given and explained the 'after visit summary' paperwork. Patient verbally acknowledged their understanding and all questions were answered.   No line in place. Patient AAOx4 and ambulatory. Patient discharged to self without incident. All belongings with patient upon discharge.

## 2022-11-05 NOTE — ED TRIAGE NOTES
"Chief Complaint   Patient presents with    Back Pain     Pt has chronic back pain. Per pt \"my work requires me to have a MD note saying that I have back problems\". Pt has hx of scoliosis.     BP (!) 151/101   Pulse 81   Temp 36.2 °C (97.1 °F) (Temporal)   Resp 18   Ht 1.575 m (5' 2\")   Wt 69 kg (152 lb 1.9 oz)   SpO2 97%   BMI 27.82 kg/m²     "

## 2022-11-05 NOTE — ED PROVIDER NOTES
"ED Provider Note    Scribed for Sai Driver M.D. by Silverio Chamberlain. 11/5/2022  10:48 AM    Primary care provider: Pcp Pt States None  Means of arrival: Walk in  History obtained from: Patient  History limited by: None    CHIEF COMPLAINT  Chief Complaint   Patient presents with    Back Pain     Pt has chronic back pain. Per pt \"my work requires me to have a MD note saying that I have back problems\". Pt has hx of scoliosis.       HPI  Marilu Quinteros is a 47 y.o. female with history of scoliosis since birth who presents for constant non-radiating chronic back pain. She rates her pain as a 10/10 . She has not had any surgeries to correct her scoliosis. Patient states she is a  at [employer]. She states she needs a doctor's note so she does not have to lift more than 20 lbs. Patient denies any lower extremity weakness, fever, chills, nausea, vomiting, diarrhea, loss of bowel or bladder control. She medicates with Aleve at night for her pain.    REVIEW OF SYSTEMS  Pertinent positives include: back pain.  Pertinent negatives include: lower extremity weakness, fever, chills, nausea, vomiting, diarrhea, loss of bowel or bladder control.    PAST MEDICAL HISTORY  Scoliosis    SOCIAL HISTORY  Social History     Tobacco Use    Smoking status: Every Day     Packs/day: 0.25     Types: Cigarettes    Smokeless tobacco: Never   Vaping Use    Vaping Use: Never used   Substance Use Topics    Alcohol use: No    Drug use: No       CURRENT MEDICATIONS  Home Medications       Reviewed by Jaycob Botello R.N. (Registered Nurse) on 11/05/22 at 0936  Med List Status: Partial     Medication Last Dose Status   clindamycin (CLEOCIN) 150 MG Cap  Active   methylPREDNISolone (MEDROL, CAMPBELL,) 4 MG tablet  Active                    ALLERGIES  No Known Allergies    PHYSICAL EXAM  VITAL SIGNS: BP (!) 151/101   Pulse 81   Temp 36.2 °C (97.1 °F) (Temporal)   Resp 18   Ht 1.575 m (5' 2\")   Wt 69 kg (152 lb 1.9 oz)   SpO2 97%   BMI " 27.82 kg/m²  Reviewed and afebrile, hypertensive  Constitutional :  Well developed, Well nourished, well-appearing adult female.   Cardiovascular: Regular rhythm, No murmurs, No rubs, No gallops.  No cyanosis.   Respiratory: No rales, rhonchi, wheeze, cough  Abdomen:  Soft, nontender  Back: Some curvature of spine with flexion, no focal tenderness.  Skin: Warm, dry, no erythema, no rash.   Musculoskeletal: no limb deformities.  Neurological: Tiptoe and heel walk  are 5/5 and symmetric.  Sensation is intact to light touch in the both legs.  Deep tendon reflexes 3+ patellar and 1+ Achilles bilateral    ED COURSE:    10:48 AM - Patient seen and examined at bedside. Patient will be given a doctor's note. The plan for discharge was discussed. The differential diagnosis includes, but is not limited to: Scoiolosis, Chronic Back Pain, Strain, doubt Disc Herniation, doubt Epidural abscess. Patient and/or family was given the opportunity to ask any questions. Patient and/or family verbalizes understanding and agreement to this plan of care.       MEDICAL DECISION MAKING:  Patient presents with a history of scoliosis and chronic stable back pain needing a note for work allowing her to lift less than 20 pounds.  There is no evidence of any neurologic deficit or epidural abscess.  There is no indication for where the work-up.    DISPOSITION: Home    PLAN:  Released to work as requested    Scoliosis handout given  Return for fever, neurologic deficit, severe back pain    [facility] [address]  [address], [address]  [facility] [address]  [phone]        [facility]  [address]  [facility] [address]  [phone]        [facility]  [address] [facility] [address] [phone]  Schedule an appointment as soon as possible for a visit   with one of the three for a new primary physician    CONDITION:  Good.    FINAL IMPRESSION:  1. Scoliosis, unspecified scoliosis  type, unspecified spinal region    2. Chronic back pain, unspecified back location, unspecified back pain laterality         I, Silverio Chamberlain (Scribe), am scribing for, and in the presence of, Sai Driver M.D..    Electronically signed by: Silverio Chamberlain (Scribe), 11/5/2022    Electronically signed by: Silverio Chamberlain, 11/5/2022    The note accurately reflects work and decisions made by me.  Sai Driver M.D.  11/5/2022  3:33 PM

## 2022-11-05 NOTE — DISCHARGE INSTRUCTIONS
Take ibuprofen and/or Tylenol for pain.  Follow-up with your primary physician.  See a doctor if you have back pain associated with weakness, bowel or bladder issue or if you have back pain associated with fever.